# Patient Record
Sex: MALE | Race: WHITE | NOT HISPANIC OR LATINO | Employment: FULL TIME | URBAN - METROPOLITAN AREA
[De-identification: names, ages, dates, MRNs, and addresses within clinical notes are randomized per-mention and may not be internally consistent; named-entity substitution may affect disease eponyms.]

---

## 2020-06-19 ENCOUNTER — TELEPHONE (OUTPATIENT)
Dept: ORTHOPEDICS | Facility: CLINIC | Age: 55
End: 2020-06-19

## 2020-06-29 ENCOUNTER — OFFICE VISIT (OUTPATIENT)
Dept: ORTHOPEDICS | Facility: CLINIC | Age: 55
End: 2020-06-29
Payer: COMMERCIAL

## 2020-06-29 VITALS
HEART RATE: 49 BPM | SYSTOLIC BLOOD PRESSURE: 135 MMHG | HEIGHT: 70 IN | BODY MASS INDEX: 24.05 KG/M2 | DIASTOLIC BLOOD PRESSURE: 84 MMHG | WEIGHT: 168 LBS

## 2020-06-29 DIAGNOSIS — M25.551 POSTERIOR PAIN OF RIGHT HIP: Primary | ICD-10-CM

## 2020-06-29 DIAGNOSIS — R26.9 GAIT ABNORMALITY: ICD-10-CM

## 2020-06-29 PROCEDURE — 99999 PR PBB SHADOW E&M-EST. PATIENT-LVL III: CPT | Mod: PBBFAC,,, | Performed by: PHYSICAL MEDICINE & REHABILITATION

## 2020-06-29 PROCEDURE — 3008F PR BODY MASS INDEX (BMI) DOCUMENTED: ICD-10-PCS | Mod: CPTII,S$GLB,, | Performed by: PHYSICAL MEDICINE & REHABILITATION

## 2020-06-29 PROCEDURE — 99203 OFFICE O/P NEW LOW 30 MIN: CPT | Mod: S$GLB,,, | Performed by: PHYSICAL MEDICINE & REHABILITATION

## 2020-06-29 PROCEDURE — 99203 PR OFFICE/OUTPT VISIT, NEW, LEVL III, 30-44 MIN: ICD-10-PCS | Mod: S$GLB,,, | Performed by: PHYSICAL MEDICINE & REHABILITATION

## 2020-06-29 PROCEDURE — 3008F BODY MASS INDEX DOCD: CPT | Mod: CPTII,S$GLB,, | Performed by: PHYSICAL MEDICINE & REHABILITATION

## 2020-06-29 PROCEDURE — 99999 PR PBB SHADOW E&M-EST. PATIENT-LVL III: ICD-10-PCS | Mod: PBBFAC,,, | Performed by: PHYSICAL MEDICINE & REHABILITATION

## 2020-06-29 NOTE — PATIENT INSTRUCTIONS
Working through these progressions:    Unless otherwise stated, you should only be doing one exercise from each progression listed below. These are listed in order of difficulty, so start with the first one in each list. Do as many reps as you can while maintaining excellent form. Stop doing the exercise if you fatigue or can't maintain proper form. Once you can do the recommended amount of reps for that exercise, stop doing that one and move on to the next exercise in that progression list for your next workout. Try for 2 - 3 workouts per week.    Bridge Progression: The purpose is to practice pushing the leg back using the glutes while maintaining a neutral spine. Start with your foot close enough to touch the heel. Brace your core without letting your back arch or flatten, or rotate for single leg exercises. Squeeze the glutes and drive down through the heel. If you feel tightness in the low back, you need to either brace the core more, use the glutes more, or don't lift the hips quite as high. Arms down is easier, arms up is harder.     1. Double leg bridge with arms up: 3 x 10 - 20        2. Bridge march with arms up: 3 x 20 Don't allow the hips to rotate.        3. Single leg bridge arms up: 3 x 10 - 15 each leg         Hip Abduction Progression:      Lateral control - This is one of the most common problems with runners and can contribute to many different injuries and wasted energy. Improving hip strength will help significantly. As your hips get stronger, think about actively squeezing your glutes when you run - that will help get these muscles firing. Do only 1 of these exercises per workout and advance to the next exercise once you can do the recommended amount of reps.     2. Side lying leg lift (keep leg slightly extended): 3 x 15      3. Seven way hips: Start with 3 x 2 - 3 reps and progress to 3 x 8 - 10 reps. Follow this link:  https://www.[employer].com/watch?v=YdenOdoz-MI      4. Standing hip hikes: 3  x 20  (bonus points: hold core tight, good posture with weight on forefoot, and toe yoga)          5. Oscar hip exercises: 3 x 8 - 12 (Start with 8 reps. Once you build up to 12, use a tighter band.)    A. Theraband pulling straight out to side. (second exercise in this video: https://youtu.be/yW0jjElAWpH )             B. Theraband pulling 45 degrees posterior.            C. Standing hip rotations with resistance:  https://www.[employer].com/watch?v=gfHmUIuDmG0          6. Monster Walks: 3 x 30 - 60 seconds. See video: https://www.[facility] Careube.com/watch?y=yhC226VGRml  You could also do monster walks going around a small square with 5 to 10 foot sides. Go around it in each direction.    7. Standing Clamshells: 3 x 8 - 12 reps  https://www.[employer].com/watch?v=JESzBJsG7ZD      Bird-dog Progression: Excellent for working on moving limbs while maintaining spine neutral position. Don't allow the back to arch, flex, or rotate while doing these. You can place a dowel nicol or foam roller across the low back to let you know if you are rolling side to side.     1. Quadruped, Arms Only: 3 x 8 - 12        2. Quadruped, Legs Only: 3 x 8 - 12      3. Quadruped, Alternating Arm & Leg: 3 x 10 - 12 (Keep core tight, dont let back arch or roll side to side)              4. Donkey Kicks: 3 x 15 (same cues as above)      5. Fire Hydrants: 3 x 15      6. Ousmane Dogs: 3 x 12  The leg kick back is similar to the first exercise in the series, but rather than kicking straight back, youll kick about 45 degrees out and back and also externally rotate the foot out (toes point away from the body). For video: https://www.[employer].com/watch?v=5V6cF2u1VC4         Intermediate Glute Max Progression:     With these exercises:  - continue to focus on abdominal bracing  - think about using your foot muscles (toe yoga) to prevent the foot from collapsing in  - keep the middle of the kneecap lined up over the second toe  - good control is more important than  "the speed of the exercise or how many you do!  - OK to do more than one of these at a time, if your glutes can handle it.    1. Chair of Death squats: 3 x 15   Drive hips back, keeping tibia as vertical as possible. The bar Im holding is to remind me not to flex my spine on the way down, or arch my back on the way up. Think "sit back" rather than "squat down".        2. Lunges: 3 x 10 Keep torso upright and dont let knee go past toes. Do multi-directional lunges for bonus points.      3. Hip Hinges: 3 x 15 Use a braced core to keep the spine in a neutral position. A stick can help you to know if spine is staying in right position. Think hips back, hips forward. Ok to bend the knees slightly to take stretch off of the hamstrings.       4. Single leg deadlifts: 3 x 10    Remember to keep spine straight as well as the leg you are kicking back. Only once you are sure youre using good form should you drop the nicol and pick-up a dumbbell or barbell.       "

## 2020-06-29 NOTE — PROGRESS NOTES
SPORTS MEDICINE / PM&R NEW PATIENT H & P:    Referring Physician: Self, Aaareferral    Chief Complaint   Patient presents with    Right Hip - Pain       HPI: This is a 54 y.o.  male being seen in clinic today for evaluation of Pain of the Right Hip   The problem first began he states when he was 48 he did the [employer] [place]. Hip pain came on during the race and persisted after. Normal x-rays with Dr. Daniel Zhou and referred to PT. He feels aching and intermittent pain on his right upper posterior hip area. The symptoms are worsening. He has tried osteo bi-flex without improvement. He has tried physical therapy Stiven Zhou, PT. Pain has been intermittent over the years. Pain is in the posterior lateral hip. Returns any time he begins increasing running. Has not been doing strengthening as much recently.    History obtained from patient.    Past family, medical, social, surgical history, and vital signs reviewed in chart.    Review of Systems   Constitutional: Negative for chills, fever and weight loss.   HENT: Negative for hearing loss and sore throat.    Eyes: Negative for blurred vision, photophobia and pain.   Respiratory: Negative for shortness of breath.    Cardiovascular: Negative for chest pain.   Gastrointestinal: Negative for abdominal pain.   Genitourinary: Negative for dysuria.   Skin: Negative for rash.   Neurological: Negative for tingling and headaches.   Endo/Heme/Allergies: Does not bruise/bleed easily.   Psychiatric/Behavioral: Negative for depression.       General    Nursing note and vitals reviewed.  Constitutional: He is oriented to person, place, and time. He appears well-developed and well-nourished.   HENT:   Head: Normocephalic and atraumatic.   Eyes: Conjunctivae and EOM are normal. Pupils are equal, round, and reactive to light.   Neck: Neck supple.   Cardiovascular: Intact distal pulses.    Pulmonary/Chest: Effort normal. No respiratory distress.   Abdominal: He exhibits no  distension.   Neurological: He is alert and oriented to person, place, and time. He has normal reflexes.   Psychiatric: He has a normal mood and affect.     General Musculoskeletal Exam   Gait: normal   Pelvic Obliquity: none    Right Ankle/Foot Exam     Tests   Heel Walk: able to perform  Tiptoe Walk: able to perform  Double Heel Rise: unable to perform double heel rise    Left Ankle/Foot Exam     Tests   Heel Walk: able to perform  Tiptoe Walk: able to perform  Double Heel Rise: able to perform      Right Hip Exam   Right hip exam is normal.     Comments:  No pain with internal/external rotation. Mild TTP right upper glute max.  Left Hip Exam   Left hip exam is normal.      Back (L-Spine & T-Spine) / Neck (C-Spine) Exam   Back exam is normal.    Tenderness Posterior midline palpation reveals tenderness of the Lower L-Spine, Sacrum and Upper L-Spine.     Back (L-Spine & T-Spine) Range of Motion   Extension: normal   Flexion: normal   Lateral bend right: normal   Lateral bend left: normal   Rotation right: normal   Rotation left: normal     Spinal Sensation   Right Side Sensation  L-Spine Level: normal  Left Side Sensation  L-Spine Level: normal    Back (L-Spine & T-Spine) Tests   Right Side Tests  Straight leg raise:      Sitting SLR: > 70 degrees    Squat Test: able to perform  Left Side Tests  Straight leg raise:     Sitting SLR: > 70 degrees    Squat: able to perform    Other He has no scoliosis .  Spinal Kyphosis:  Absent  Spinal Lordosis:  Absent      Muscle Strength   Right Lower Extremity   Hip Abduction: 5/5   Hip Flexion: 5/5   Hip Extensors: 5/5  Quadriceps:  5/5   Hamstrin/5   Anterior tibial:  5/5/5  Gastrocsoleus:  5/5/5  EHL:  5/5  Left Lower Extremity   Hip Abduction: 5/5   Hip Flexion: 5/5   Hip Extensors: 5/5  Quadriceps:  5/5   Hamstrin/5   Anterior tibial:  5/5/5   Gastrocsoleus:  5/5/5  EHL:  5/5    Reflexes     Left Side  Quadriceps:  2+  Achilles:  2+  Ankle Clonus:  absent    Right  Side   Quadriceps:  2+  Achilles:  2+  Ankle Clonus:  absent    Vascular Exam     Right Pulses  Dorsalis Pedis:      2+          Left Pulses  Dorsalis Pedis:      2+              IMPRESSION/PLAN: Jean Claude is a 54 y.o.  male with:    1. Posterior pain of right hip    2. Gait abnormality       The findings were discussed with Jean Claude in detail. We discussed this is possibly back related, probably glute muscle related, but probably not hip joint related. He wants to work on this on his own and not go to PT at this time. He was given a long list of exercises to work on at home and is familiar with them. If not improving in a few weeks, will get some therapy visits. Further workup could include back imaging as well. All of his questions were answered. He was provided this plan in writing. He will follow-up with me PRN.     Working through these progressions:    Unless otherwise stated, you should only be doing one exercise from each progression listed below. These are listed in order of difficulty, so start with the first one in each list. Do as many reps as you can while maintaining excellent form. Stop doing the exercise if you fatigue or can't maintain proper form. Once you can do the recommended amount of reps for that exercise, stop doing that one and move on to the next exercise in that progression list for your next workout. Try for 2 - 3 workouts per week.    Bridge Progression: The purpose is to practice pushing the leg back using the glutes while maintaining a neutral spine. Start with your foot close enough to touch the heel. Brace your core without letting your back arch or flatten, or rotate for single leg exercises. Squeeze the glutes and drive down through the heel. If you feel tightness in the low back, you need to either brace the core more, use the glutes more, or don't lift the hips quite as high. Arms down is easier, arms up is harder.     1. Double leg bridge with arms up: 3 x 10 - 20        2. Bridge  march with arms up: 3 x 20 Don't allow the hips to rotate.        3. Single leg bridge arms up: 3 x 10 - 15 each leg         Hip Abduction Progression:      Lateral control - This is one of the most common problems with runners and can contribute to many different injuries and wasted energy. Improving hip strength will help significantly. As your hips get stronger, think about actively squeezing your glutes when you run - that will help get these muscles firing. Do only 1 of these exercises per workout and advance to the next exercise once you can do the recommended amount of reps.     2. Side lying leg lift (keep leg slightly extended): 3 x 15      3. Seven way hips: Start with 3 x 2 - 3 reps and progress to 3 x 8 - 10 reps. Follow this link:  https://www.[employer]ube.com/watch?v=YdenOdoz-MI      4. Standing hip hikes: 3 x 20  (bonus points: hold core tight, good posture with weight on forefoot, and toe yoga)          5. Oscar hip exercises: 3 x 8 - 12 (Start with 8 reps. Once you build up to 12, use a tighter band.)    A. Theraband pulling straight out to side. (second exercise in this video: https://youtu.be/tN8ghOmJVgL )             B. Theraband pulling 45 degrees posterior.            C. Standing hip rotations with resistance:  https://www.[employer].com/watch?v=gfHmUIuDmG0          6. Monster Walks: 3 x 30 - 60 seconds. See video: https://www.[employer]ube.com/watch?t=auI851TRLoy  You could also do monster walks going around a small square with 5 to 10 foot sides. Go around it in each direction.    7. Standing Clamshells: 3 x 8 - 12 reps  https://www.[employer].com/watch?v=EURdETxX0QF      Bird-dog Progression: Excellent for working on moving limbs while maintaining spine neutral position. Don't allow the back to arch, flex, or rotate while doing these. You can place a dowel nicol or foam roller across the low back to let you know if you are rolling side to side.     1. Quadruped, Arms Only: 3 x 8 - 12        2. Quadruped,  "Legs Only: 3 x 8 - 12      3. Quadruped, Alternating Arm & Leg: 3 x 10 - 12 (Keep core tight, dont let back arch or roll side to side)              4. Donkey Kicks: 3 x 15 (same cues as above)      5. Fire Hydrants: 3 x 15      6. Ousmane Dogs: 3 x 12  The leg kick back is similar to the first exercise in the series, but rather than kicking straight back, youll kick about 45 degrees out and back and also externally rotate the foot out (toes point away from the body). For video: https://www.[employer].com/watch?v=6I3pA3z5LW4         Intermediate Glute Max Progression:     With these exercises:  - continue to focus on abdominal bracing  - think about using your foot muscles (toe yoga) to prevent the foot from collapsing in  - keep the middle of the kneecap lined up over the second toe  - good control is more important than the speed of the exercise or how many you do!  - OK to do more than one of these at a time, if your glutes can handle it.    1. Chair of Death squats: 3 x 15   Drive hips back, keeping tibia as vertical as possible. The bar Im holding is to remind me not to flex my spine on the way down, or arch my back on the way up. Think "sit back" rather than "squat down".        2. Lunges: 3 x 10 Keep torso upright and dont let knee go past toes. Do multi-directional lunges for bonus points.      3. Hip Hinges: 3 x 15 Use a braced core to keep the spine in a neutral position. A stick can help you to know if spine is staying in right position. Think hips back, hips forward. Ok to bend the knees slightly to take stretch off of the hamstrings.       4. Single leg deadlifts: 3 x 10    Remember to keep spine straight as well as the leg you are kicking back. Only once you are sure youre using good form should you drop the nicol and pick-up a dumbbell or barbell.         Nilda Chambers M.D.  Sports Medicine    "

## 2023-06-20 DIAGNOSIS — M25.561 RIGHT KNEE PAIN, UNSPECIFIED CHRONICITY: Primary | ICD-10-CM

## 2023-06-21 ENCOUNTER — OFFICE VISIT (OUTPATIENT)
Dept: SPORTS MEDICINE | Facility: CLINIC | Age: 58
End: 2023-06-21
Payer: COMMERCIAL

## 2023-06-21 ENCOUNTER — HOSPITAL ENCOUNTER (OUTPATIENT)
Dept: RADIOLOGY | Facility: HOSPITAL | Age: 58
Discharge: HOME | End: 2023-06-21
Attending: ORTHOPAEDIC SURGERY
Payer: COMMERCIAL

## 2023-06-21 VITALS — BODY MASS INDEX: 25.05 KG/M2 | HEIGHT: 70 IN | WEIGHT: 175 LBS

## 2023-06-21 DIAGNOSIS — M25.461 EFFUSION OF RIGHT KNEE: Primary | ICD-10-CM

## 2023-06-21 DIAGNOSIS — M25.561 RIGHT KNEE PAIN, UNSPECIFIED CHRONICITY: ICD-10-CM

## 2023-06-21 DIAGNOSIS — M25.561 ACUTE PAIN OF RIGHT KNEE: ICD-10-CM

## 2023-06-21 DIAGNOSIS — M17.10 PATELLOFEMORAL ARTHRITIS: ICD-10-CM

## 2023-06-21 PROCEDURE — 73564 X-RAY EXAM KNEE 4 OR MORE: CPT | Mod: 26,RT,, | Performed by: RADIOLOGY

## 2023-06-21 PROCEDURE — 3008F PR BODY MASS INDEX (BMI) DOCUMENTED: ICD-10-PCS | Mod: CPTII,S$GLB,, | Performed by: ORTHOPAEDIC SURGERY

## 2023-06-21 PROCEDURE — 73564 X-RAY EXAM KNEE 4 OR MORE: CPT | Mod: TC,RT

## 2023-06-21 PROCEDURE — 73562 X-RAY EXAM OF KNEE 3: CPT | Mod: 26,LT,, | Performed by: RADIOLOGY

## 2023-06-21 PROCEDURE — 3008F BODY MASS INDEX DOCD: CPT | Mod: CPTII,S$GLB,, | Performed by: ORTHOPAEDIC SURGERY

## 2023-06-21 PROCEDURE — 99999 PR PBB SHADOW E&M-EST. PATIENT-LVL III: CPT | Mod: PBBFAC,,, | Performed by: ORTHOPAEDIC SURGERY

## 2023-06-21 PROCEDURE — 73564 XR KNEE ORTHO RIGHT WITH FLEXION: ICD-10-PCS | Mod: 26,RT,, | Performed by: RADIOLOGY

## 2023-06-21 PROCEDURE — 99213 PR OFFICE/OUTPT VISIT, EST, LEVL III, 20-29 MIN: ICD-10-PCS | Mod: S$GLB,,, | Performed by: ORTHOPAEDIC SURGERY

## 2023-06-21 PROCEDURE — 99213 OFFICE O/P EST LOW 20 MIN: CPT | Mod: S$GLB,,, | Performed by: ORTHOPAEDIC SURGERY

## 2023-06-21 PROCEDURE — 99999 PR PBB SHADOW E&M-EST. PATIENT-LVL III: ICD-10-PCS | Mod: PBBFAC,,, | Performed by: ORTHOPAEDIC SURGERY

## 2023-06-21 PROCEDURE — 73562 XR KNEE ORTHO RIGHT WITH FLEXION: ICD-10-PCS | Mod: 26,LT,, | Performed by: RADIOLOGY

## 2023-06-21 PROCEDURE — 1159F MED LIST DOCD IN RCRD: CPT | Mod: CPTII,S$GLB,, | Performed by: ORTHOPAEDIC SURGERY

## 2023-06-21 PROCEDURE — 1159F PR MEDICATION LIST DOCUMENTED IN MEDICAL RECORD: ICD-10-PCS | Mod: CPTII,S$GLB,, | Performed by: ORTHOPAEDIC SURGERY

## 2023-06-21 NOTE — PROGRESS NOTES
Patient ID: Jean Claude Olivo  YOB: 1965  MRN: [id number]    Chief Complaint: Pain and Swelling of the Right Knee      Referred By: Colleen Chambers through [facility] online    History of Present Illness: Jean Claude Olivo is a  57 y.o. male   [place] with a chief complaint of Pain and Swelling of the Right Knee    The patient presents today with right knee pain. He reports onset of pain a few weeks ago with no injury. He reports he is active in running and training for a marathon in [place]. He reports anterior knee popping he reports seeing Rodney Rosenda recently who tapped his knee which provided minimal relief while running. He is unable to run at this time due to pain. He reports weakness and his knee will give out. He reports mostly anterior knee pain and some joint line tenderness. He does reports swelling. His weekly volume prior to pain was 40-50 miles a week. He finished a marathon in April in [place].     HPI    Past Medical History:   History reviewed. No pertinent past medical history.  History reviewed. No pertinent surgical history.  History reviewed. No pertinent family history.  Social History     Socioeconomic History    Marital status: Single   Tobacco Use    Smoking status: Never    Smokeless tobacco: Never   Substance and Sexual Activity    Alcohol use: Never    Drug use: Never       Review of patient's allergies indicates:  No Known Allergies  ROS    Physical Exam:   Body mass index is 25.11 kg/m².  There were no vitals filed for this visit.   GENERAL: Well appearing, appropriate for stated age, no acute distress.  CARDIOVASCULAR: Pulses regular by peripheral palpation.  PULMONARY: Respirations are even and non-labored.  NEURO: Awake, alert, and oriented x 3.  PSYCH: Mood & affect are appropriate.  HEENT: Head is normocephalic and atraumatic.            Right Knee Exam     Inspection   Effusion: present    Tenderness   The patient is tender to palpation of the  medial joint line.    Crepitus   The patient has crepitus of the patella.    Range of Motion   Extension:  0   Flexion:  130     Tests   Meniscus   Salvatore:  Medial - positive   Ligament Examination   Lachman: normal (-1 to 2mm)   PCL-Posterior Drawer: normal (0 to 2mm)     MCL - Valgus: normal (0 to 2mm)  LCL - Varus: normal    Other   Sensation: normal    Comments:  Neutral to slight varus  Mechanical symptoms with [facility]   Mild to moderate effusion  Hip range of motion symmetric     Muscle Strength   Right Lower Extremity   Hip Abduction: 5/5   Quadriceps:  5/5   Hamstrin/5     Vascular Exam     Right Pulses  Dorsalis Pedis:      2+  Posterior Tibial:      2+        Imaging:    X-ray Knee Ortho Right with Flexion  Narrative: EXAM: XR KNEE ORTHO RIGHT WITH FLEXION    CLINICAL INDICATION:   Pain in right knee    FINDINGS:  No comparison studies are available.  AP standing views, AP standing flexion views and sunrise views of both knees, as well as a lateral view of the right knee were submitted for interpretation.  Joint spaces are well-maintained.  Negative for knee joint effusion.    Alignment is satisfactory. No     fractures, dislocations, or erosive arthritic change.  Negative for radiopaque foreign bodies or air in the soft tissues.  Superior patellar spurring on the right.  Impression: 1.  Negative for acute process involving the right or left knee.  2.  Superior patellar spurring on the right.    Finalized on: 2023 7:57 AM By:  Emir Garsia MD  [facility]# [id number]      2023 07:59:[id number]    [facility]      Relevant imaging results reviewed and interpreted by me, discussed with the patient and / or family today.     Other Tests:         Patient Instructions   Assessment:  Jean Claude Olivo is a  57 y.o. male   [place] with a chief complaint of Pain and Swelling of the Right Knee    Right knee pain and swelling  Mechanical Symptoms , possible cartilage vs meniscus  PF early arthritis    Encounter  Diagnoses   Name Primary?    Effusion of right knee Yes    Acute pain of right knee     Patellofemoral arthritis       Plan:  Discussed possible involvement of back/hip, but will focus on knee at this time. Discussed possible rehab with [facility]    Knee sleeve   Concern for meniscus/cartilage; pain affecting weight bearing - MRI of right knee    Follow-up: AFTER MRI or sooner if there are any problems between now and then.    Leave Review:   Google: Leave Google Review  Healthgrades: Leave Healthgrades Review    After Hours Number: [phone]       Provider Note/Medical Decision Making:       I discussed worrisome and red flag signs and symptoms with the patient. The patient expressed understanding and agreed to alert me immediately or to go to the emergency room if they experience any of these.   Treatment plan was developed with input from the patient/family, and they expressed understanding and agreement with the plan. All questions were answered today.          Jerald Wiley MD  Orthopaedic Surgery & Sports Medicine       Disclaimer: This note was prepared using a voice recognition system and is likely to have sound alike errors within the text.     I, Sarai Fairbanks, acted as a scribe for Jerald Wiley MD for the duration of this office visit.

## 2023-06-21 NOTE — PATIENT INSTRUCTIONS
Assessment:  Jean Claude Olivo is a  57 y.o. male   [place] with a chief complaint of Pain and Swelling of the Right Knee    Right knee pain and swelling  Mechanical Symptoms , possible cartilage vs meniscus  PF early arthritis    Encounter Diagnoses   Name Primary?    Effusion of right knee Yes    Acute pain of right knee     Patellofemoral arthritis       Plan:  Discussed possible involvement of back/hip, but will focus on knee at this time. [facility]    Knee sleeve   Concern for meniscus/cartilage; pain affecting weight bearing - MRI of right knee    Follow-up: AFTER MRI or sooner if there are any problems between now and then.    Leave Review:   Google: Leave Google Review  Healthgrades: Leave Healthgrades Review    After Hours Number: [phone]

## 2023-06-23 ENCOUNTER — HOSPITAL ENCOUNTER (OUTPATIENT)
Dept: RADIOLOGY | Facility: HOSPITAL | Age: 58
Discharge: HOME | End: 2023-06-23
Attending: ORTHOPAEDIC SURGERY
Payer: COMMERCIAL

## 2023-06-23 DIAGNOSIS — M25.461 EFFUSION OF RIGHT KNEE: ICD-10-CM

## 2023-06-23 DIAGNOSIS — M17.10 PATELLOFEMORAL ARTHRITIS: ICD-10-CM

## 2023-06-23 DIAGNOSIS — M25.561 ACUTE PAIN OF RIGHT KNEE: ICD-10-CM

## 2023-06-23 PROCEDURE — 73721 MRI JNT OF LWR EXTRE W/O DYE: CPT | Mod: TC,PN,RT

## 2023-06-23 PROCEDURE — 73721 MRI JNT OF LWR EXTRE W/O DYE: CPT | Mod: 26,RT,, | Performed by: RADIOLOGY

## 2023-06-23 PROCEDURE — 73721 MRI KNEE WITHOUT CONTRAST RIGHT: ICD-10-PCS | Mod: 26,RT,, | Performed by: RADIOLOGY

## 2023-06-29 ENCOUNTER — OFFICE VISIT (OUTPATIENT)
Dept: SPORTS MEDICINE | Facility: CLINIC | Age: 58
End: 2023-06-29
Payer: COMMERCIAL

## 2023-06-29 VITALS — WEIGHT: 175 LBS | BODY MASS INDEX: 25.05 KG/M2 | HEIGHT: 70 IN

## 2023-06-29 DIAGNOSIS — M17.10 PATELLOFEMORAL ARTHRITIS: Primary | ICD-10-CM

## 2023-06-29 DIAGNOSIS — M24.10 ACQUIRED DEFECT OF ARTICULAR CARTILAGE: ICD-10-CM

## 2023-06-29 PROCEDURE — 3008F BODY MASS INDEX DOCD: CPT | Mod: CPTII,S$GLB,, | Performed by: ORTHOPAEDIC SURGERY

## 2023-06-29 PROCEDURE — 99999 PR PBB SHADOW E&M-EST. PATIENT-LVL III: CPT | Mod: PBBFAC,,, | Performed by: ORTHOPAEDIC SURGERY

## 2023-06-29 PROCEDURE — 99999 PR PBB SHADOW E&M-EST. PATIENT-LVL III: ICD-10-PCS | Mod: PBBFAC,,, | Performed by: ORTHOPAEDIC SURGERY

## 2023-06-29 PROCEDURE — 99214 PR OFFICE/OUTPT VISIT, EST, LEVL IV, 30-39 MIN: ICD-10-PCS | Mod: S$GLB,,, | Performed by: ORTHOPAEDIC SURGERY

## 2023-06-29 PROCEDURE — 99214 OFFICE O/P EST MOD 30 MIN: CPT | Mod: S$GLB,,, | Performed by: ORTHOPAEDIC SURGERY

## 2023-06-29 PROCEDURE — 3008F PR BODY MASS INDEX (BMI) DOCUMENTED: ICD-10-PCS | Mod: CPTII,S$GLB,, | Performed by: ORTHOPAEDIC SURGERY

## 2023-06-29 NOTE — PROGRESS NOTES
Patient ID: Jean Claude Olivo  YOB: 1965  MRN: [id number]    Chief Complaint: Follow-up of the Right Knee    Referred By: Colleen Chambers through [facility] online    History of Present Illness: Jean Claude Olivo is a  57 y.o. male   Jewler with a chief complaint of Follow-up of the Right Knee      The patient presents  today for follow up and MRI review of his right knee. He has not been able to run at this time due to the pain.     Recall from last visit: The patient presents today with right knee pain. He reports onset of pain a few weeks ago with no injury. He reports he is active in running and training for a marathon in [place]. He reports anterior knee popping he reports seeing Rodney Herzog recently who tapped his knee which provided minimal relief while running. He is unable to run at this time due to pain. He reports weakness and his knee will give out. He reports mostly anterior knee pain and some joint line tenderness. He does reports swelling. His weekly volume prior to pain was 40-50 miles a week. He finished a marathon in April in [place].     HPI    Past Medical History:   No past medical history on file.  No past surgical history on file.  No family history on file.  Social History     Socioeconomic History    Marital status: Single   Tobacco Use    Smoking status: Never    Smokeless tobacco: Never   Substance and Sexual Activity    Alcohol use: Never    Drug use: Never       Review of patient's allergies indicates:  No Known Allergies  ROS    Physical Exam:   Body mass index is 25.11 kg/m².  There were no vitals filed for this visit.   GENERAL: Well appearing, appropriate for stated age, no acute distress.  CARDIOVASCULAR: Pulses regular by peripheral palpation.  PULMONARY: Respirations are even and non-labored.  NEURO: Awake, alert, and oriented x 3.  PSYCH: Mood & affect are appropriate.  HEENT: Head is normocephalic and atraumatic.            Right Knee Exam      Inspection   Effusion: present    Tenderness   The patient is tender to palpation of the medial joint line.    Crepitus   The patient has crepitus of the patella.    Range of Motion   Extension:  0   Flexion:  130     Tests   Meniscus   Juan:  Medial - positive   Ligament Examination   Lachman: normal (-1 to 2mm)   PCL-Posterior Drawer: normal (0 to 2mm)     MCL - Valgus: normal (0 to 2mm)  LCL - Varus: normal    Other   Sensation: normal    Comments:  Neutral to slight varus  Mechanical symptoms with Juan   Mild to moderate effusion  Hip range of motion symmetric     Muscle Strength   Right Lower Extremity   Hip Abduction: 5/5   Quadriceps:  5/5   Hamstrin/5     Vascular Exam     Right Pulses  Dorsalis Pedis:      2+  Posterior Tibial:      2+        Imaging:    MRI Knee Without Contrast Right  Narrative: EXAM: MRI KNEE WITHOUT CONTRAST RIGHT    CLINICAL HISTORY: Right knee pain.    TECHNIQUE: Standard multiplanar pulse sequences knee obtained without IV or intra-articular contrast.    COMPARISON: None.    FINDINGS:    Normal cruciate ligaments.  Normal collateral ligaments.  Low to moderate grade tendinosis distal quadriceps tendon with low-grade intrasubstance and insertional partial tearing.  Mild adjacent soft tissue edema.  Minimal tendinosis distal quadriceps tendon.    Intact medial meniscus.    Normal lateral meniscus.    Minimal focal chondral irregularity mid weightbearing surface medial femoral condyle.    Lateral compartment cartilage well-preserved.    Patella cartilage well-preserved.  Large full-thickness chondral defect superior aspect of the central and lateral trochlea measures 12 x 14 mm.  Moderate underlying subcutaneous chondral marrow edema.    Minimal effusion.  Suprapatella plica identified.  Trace fluid semimembranosus/medial gastrocnemius bursa.  No intra-articular loose body.  Remaining bony architecture marrow signal normal.             Mild soft tissue edema anterior  subcutaneous cutaneous fat.  Remaining spring soft tissues and musculature are normal.  Impression: 1.    Low to moderate grade tendinosis distal quadriceps tendon with low-grade intrasubstance and insertional partial tearing.    2.    Minimal focal chondral degeneration mid weightbearing surface medial femoral condyle.    3.    12 x 14 mm full-thickness chondral defect or possible chondral fracture of the trochlea with underlying marrow edema.  No associated chondral loose body can be identified.    Finalized on: 6/23/2023 11:58 AM By:  Javid Shannon MD  [facility]# [id number]      2023-06-23 12:00:17.035    [facility]      Relevant imaging results reviewed and interpreted by me, discussed with the patient and / or family today.     Other Tests:         Patient Instructions   Assessment:  Jean Claude Olivo is a  57 y.o. male   Jewler with a chief complaint of Follow-up of the Right Knee    Minimal focal chondral degeneration mid weightbearing surface medial femoral condyle.  PF early arthritis  2 x 14 mm full-thickness chondral defect of the trochlea with underlying marrow edema    Encounter Diagnoses   Name Primary?    Patellofemoral arthritis Yes    Acquired defect of articular cartilage         Plan:  Rehab with Kecia at [facility]-patient preferred location   Recommend low impact at this time, goal would be to get patient back to running at this time  Continue knee sleeve  Discussed possible role of arthroscopy in the future   Discussed possible CSI-patient deferred at this time   Discussed possible visco-supplementation in the future and/or PRP ACP injection    Peer-reviewed literature has demonstrated the efficacy of leukocyte-poor platelet-rich plasma (PRP) for knee arthritis. PRP is known to decrease inflammatory enzymes in the knee (GEN Llamas, 2016). It also provides grow factors that stimulate stem cells and cartilage cells (Makeda ochoa al, J Knee Surg, 2018). Research suggests that in some cases it is as  effective as stem cell injections. PRP has been shown to be more effective for knee arthritis than corticosteroid injections or viscosupplementation in terms of knee pain and function.  PRP has been shown to be more cost-effective than other forms of treatment for knee arthritis. (Juan R et al, IJCR 2018)  Research has also shown that PRP + viscosupplementation may be synergistic. In fact, chondrocytes cultured with both PRP and HA showed increased proliferation rates and glycosaminoglycan contents compared with those cultured with HA alone, suggesting that the combined injection may provide a more therapeutic environment (Korey et al., Arthroscopy, 2018).       Follow-up: 2 months or sooner if there are any problems between now and then.    Leave Review:   Google: Leave Google Review  Healthgrades: Leave Healthgrades Review    After Hours Number: [phone]     k  Provider Note/Medical Decision Making:       I had a long discussion with the patient about the causes, treatments, and prognosis for knee arthritis. We discussed that although there is not a cure for arthritis, there are effective ways to improve symptoms. I recommended low impact activities such as elliptical and bicycle. I talked about the fact that aquatic and pool therapy is often helpful. I advised the patient to consider good quality stability and cushioned shoes. We talked about the importance of knee motion in the health of the knee. The patient can also use a compression knee sleeve to help limit swelling and provide proprioceptive feedback. We recommend formal physical therapy or at minimum a home exercise program, which we discussed with the patient. I advised that over the counter solutions such as glucosamine and chondroitin may help with symptoms.  I discussed worrisome and red flag signs and symptoms with the patient. The patient expressed understanding and agreed to alert me immediately or to go to the emergency room if they  experience any of these.   Treatment plan was developed with input from the patient/family, and they expressed understanding and agreement with the plan. All questions were answered today.          Jerald Wiley MD  Orthopaedic Surgery & Sports Medicine       Disclaimer: This note was prepared using a voice recognition system and is likely to have sound alike errors within the text.     I, Sarai Fairbanks, acted as a scribe for Jerald Wiley MD for the duration of this office visit.

## 2023-06-29 NOTE — PATIENT INSTRUCTIONS
Assessment:  Jean Claude Olivo is a  57 y.o. male   Jewler with a chief complaint of Follow-up of the Right Knee    Minimal focal chondral degeneration mid weightbearing surface medial femoral condyle.  PF early arthritis  2 x 14 mm full-thickness chondral defect of the trochlea with underlying marrow edema    Encounter Diagnoses   Name Primary?    Patellofemoral arthritis Yes    Acquired defect of articular cartilage         Plan:  Rehab with Kecia at [facility]-patient preferred location   Recommend low impact at this time, goal would be to get patient back to running at this time  Continue knee sleeve  Discussed possible role of arthroscopy in the future   Discussed possible CSI-patient deferred at this time   Discussed possible visco-supplementation in the future and/or PRP ACP injection    Peer-reviewed literature has demonstrated the efficacy of leukocyte-poor platelet-rich plasma (PRP) for knee arthritis. PRP is known to decrease inflammatory enzymes in the knee (GEN Llamas, 2016). It also provides grow factors that stimulate stem cells and cartilage cells (Makeda et al, J Knee Surg, 2018). Research suggests that in some cases it is as effective as stem cell injections. PRP has been shown to be more effective for knee arthritis than corticosteroid injections or viscosupplementation in terms of knee pain and function.  PRP has been shown to be more cost-effective than other forms of treatment for knee arthritis. (Juan R et al, IJCR 2018)  Research has also shown that PRP + viscosupplementation may be synergistic. In fact, chondrocytes cultured with both PRP and HA showed increased proliferation rates and glycosaminoglycan contents compared with those cultured with HA alone, suggesting that the combined injection may provide a more therapeutic environment (Korey et al., Arthroscopy, 2018).       Follow-up: 2 months or sooner if there are any problems between now and then.    Leave Review:   Google: Leave  Google Review  Healthgrades: Leave Healthgrades Review    After Hours Number: [phone] k

## 2023-08-14 ENCOUNTER — OFFICE VISIT (OUTPATIENT)
Dept: SPORTS MEDICINE | Facility: CLINIC | Age: 58
End: 2023-08-14
Payer: COMMERCIAL

## 2023-08-14 VITALS — BODY MASS INDEX: 25.05 KG/M2 | HEIGHT: 70 IN | WEIGHT: 175 LBS

## 2023-08-14 DIAGNOSIS — M24.10 ARTICULAR CARTILAGE DISORDER: Primary | ICD-10-CM

## 2023-08-14 DIAGNOSIS — M17.11 OSTEOARTHRITIS OF RIGHT PATELLOFEMORAL JOINT: ICD-10-CM

## 2023-08-14 PROCEDURE — 99999 PR PBB SHADOW E&M-EST. PATIENT-LVL III: ICD-10-PCS | Mod: PBBFAC,,, | Performed by: ORTHOPAEDIC SURGERY

## 2023-08-14 PROCEDURE — 3008F PR BODY MASS INDEX (BMI) DOCUMENTED: ICD-10-PCS | Mod: CPTII,S$GLB,, | Performed by: ORTHOPAEDIC SURGERY

## 2023-08-14 PROCEDURE — 99999 PR PBB SHADOW E&M-EST. PATIENT-LVL III: CPT | Mod: PBBFAC,,, | Performed by: ORTHOPAEDIC SURGERY

## 2023-08-14 PROCEDURE — 99214 OFFICE O/P EST MOD 30 MIN: CPT | Mod: S$GLB,,, | Performed by: ORTHOPAEDIC SURGERY

## 2023-08-14 PROCEDURE — 1159F PR MEDICATION LIST DOCUMENTED IN MEDICAL RECORD: ICD-10-PCS | Mod: CPTII,S$GLB,, | Performed by: ORTHOPAEDIC SURGERY

## 2023-08-14 PROCEDURE — 3008F BODY MASS INDEX DOCD: CPT | Mod: CPTII,S$GLB,, | Performed by: ORTHOPAEDIC SURGERY

## 2023-08-14 PROCEDURE — 1159F MED LIST DOCD IN RCRD: CPT | Mod: CPTII,S$GLB,, | Performed by: ORTHOPAEDIC SURGERY

## 2023-08-14 PROCEDURE — 99214 PR OFFICE/OUTPT VISIT, EST, LEVL IV, 30-39 MIN: ICD-10-PCS | Mod: S$GLB,,, | Performed by: ORTHOPAEDIC SURGERY

## 2023-08-14 NOTE — PATIENT INSTRUCTIONS
Assessment:  Jean Claude Olivo is a  57 y.o. male   Jewler with a chief complaint of Pain and Follow-up of the Right Knee    Minimal focal chondral degeneration mid weightbearing surface medial femoral condyle.  PF early arthritis  2 x 14 mm full-thickness chondral defect of the trochlea with underlying marrow edema    Encounter Diagnoses   Name Primary?    Articular cartilage disorder Yes    Osteoarthritis of right patellofemoral joint         Plan:  Discussed treatment options and diagnosis and goals of patients needs   Recommend low impact at this time, goal would be to get patient back to running at this time at the mileage at this point   Continue knee sleeve  Discussed possible use of medial    Recommend exercises to focus on the glute, can either do Colleen Chambers's class   Discussed possible visco-supplementation in the future and/or PRP ACP injection    Peer-reviewed literature has demonstrated the efficacy of leukocyte-poor platelet-rich plasma (PRP) for knee arthritis. PRP is known to decrease inflammatory enzymes in the knee (GEN Llamas, 2016). It also provides grow factors that stimulate stem cells and cartilage cells (Makeda et al, J Knee Surg, 2018). Research suggests that in some cases it is as effective as stem cell injections. PRP has been shown to be more effective for knee arthritis than corticosteroid injections or viscosupplementation in terms of knee pain and function.  PRP has been shown to be more cost-effective than other forms of treatment for knee arthritis. (Juan R et al, IJCR 2018)  Research has also shown that PRP + viscosupplementation may be synergistic. In fact, chondrocytes cultured with both PRP and HA showed increased proliferation rates and glycosaminoglycan contents compared with those cultured with HA alone, suggesting that the combined injection may provide a more therapeutic environment (Korey et al., Arthroscopy, 2018).       Follow-up: As needed or sooner if  there are any problems between now and then.    Leave Review:   Google: Leave Google Review  Healthgrades: Leave Healthgrades Review    After Hours Number: [phone] k

## 2023-08-14 NOTE — PROGRESS NOTES
Patient ID: Jean Claude Olivo  YOB: 1965  MRN: [id number]    Chief Complaint: Pain and Follow-up of the Right Knee    Referred By: Colleen Chambers through [facility] online    History of Present Illness: Jean Claude Olivo is a 57 y.o. male   Jewler with a chief complaint of Pain and Follow-up of the Right Knee    The patient presents today for follow up of his right knee. He has not been able to run at this time due to the pain. He did try to walk about 5 miles this past weekend with no issues or pain. He overall doing better, but he is also resting at this time. He is still hoping to be able to run the [place] Marathon. He has not done physical therapy. He does have some pain with stepping up a curb.      Previous HPI 6/29/2023: The patient presents  today for follow up and MRI review of his right knee. He has not been able to run at this time due to the pain.      Recall from last visit 6/21/2023: The patient presents today with right knee pain. He reports onset of pain a few weeks ago with no injury. He reports he is active in running and training for a marathon in [place]. He reports anterior knee popping he reports seeing Rodney Herzog recently who tapped his knee which provided minimal relief while running. He is unable to run at this time due to pain. He reports weakness and his knee will give out. He reports mostly anterior knee pain and some joint line tenderness. He does reports swelling. His weekly volume prior to pain was 40-50 miles a week. He finished a marathon in April in [place].     HPI    Past Medical History:   History reviewed. No pertinent past medical history.  History reviewed. No pertinent surgical history.  History reviewed. No pertinent family history.  Social History     Socioeconomic History    Marital status: Single   Tobacco Use    Smoking status: Never    Smokeless tobacco: Never   Substance and Sexual Activity    Alcohol use: Never    Drug use: Never        Review of patient's allergies indicates:  No Known Allergies  ROS    Physical Exam:   Body mass index is 25.11 kg/m².  There were no vitals filed for this visit.   GENERAL: Well appearing, appropriate for stated age, no acute distress.  CARDIOVASCULAR: Pulses regular by peripheral palpation.  PULMONARY: Respirations are even and non-labored.  NEURO: Awake, alert, and oriented x 3.  PSYCH: Mood & affect are appropriate.  HEENT: Head is normocephalic and atraumatic.            Right Knee Exam     Inspection   Effusion: absent    Tenderness   The patient is tender to palpation of the medial joint line (Trochlea).    Crepitus   The patient has crepitus of the patella.    Range of Motion   Extension:  0   Flexion:  120     Tests   Meniscus   Salvatore:  Medial - negative   Patella   Patellar Grind: positive    Other   Sensation: normal    Comments:  Intact EHL, FHL, gastrocsoleus, and tibialis anterior. Sensation intact to light touch in superficial peroneal, deep peroneal, tibial, sural, and saphenous nerve distributions. Foot warm and well perfused with capillary refill of less than 2 seconds and palpable pedal pulses.      Muscle Strength   Right Lower Extremity   Hip Abduction: 5/5   Quadriceps:  5/5   Hamstrin/5     Vascular Exam     Right Pulses  Dorsalis Pedis:      2+  Posterior Tibial:      2+          Imaging:    MRI Knee Without Contrast Right  Narrative: EXAM: MRI KNEE WITHOUT CONTRAST RIGHT    CLINICAL HISTORY: Right knee pain.    TECHNIQUE: Standard multiplanar pulse sequences knee obtained without IV or intra-articular contrast.    COMPARISON: None.    FINDINGS:    Normal cruciate ligaments.  Normal collateral ligaments.  Low to moderate grade tendinosis distal quadriceps tendon with low-grade intrasubstance and insertional partial tearing.  Mild adjacent soft tissue edema.  Minimal tendinosis distal quadriceps tendon.    Intact medial meniscus.    Normal lateral meniscus.    Minimal focal  chondral irregularity mid weightbearing surface medial femoral condyle.    Lateral compartment cartilage well-preserved.    Patella cartilage well-preserved.  Large full-thickness chondral defect superior aspect of the central and lateral trochlea measures 12 x 14 mm.  Moderate underlying subcutaneous chondral marrow edema.    Minimal effusion.  Suprapatella plica identified.  Trace fluid semimembranosus/medial gastrocnemius bursa.  No intra-articular loose body.  Remaining bony architecture marrow signal normal.             Mild soft tissue edema anterior subcutaneous cutaneous fat.  Remaining spring soft tissues and musculature are normal.  Impression: 1.    Low to moderate grade tendinosis distal quadriceps tendon with low-grade intrasubstance and insertional partial tearing.    2.    Minimal focal chondral degeneration mid weightbearing surface medial femoral condyle.    3.    12 x 14 mm full-thickness chondral defect or possible chondral fracture of the trochlea with underlying marrow edema.  No associated chondral loose body can be identified.    Finalized on: 6/23/2023 11:58 AM By:  Javid Shannon MD  [facility]# [id number]      2023-06-23 12:00:17.035    [facility]      Relevant imaging results reviewed and interpreted by me, discussed with the patient and / or family today.     Other Tests:         Patient Instructions   Assessment:  Jean Claude Olivo is a  57 y.o. male   Jewler with a chief complaint of Pain and Follow-up of the Right Knee    Minimal focal chondral degeneration mid weightbearing surface medial femoral condyle.  PF early arthritis  2 x 14 mm full-thickness chondral defect of the trochlea with underlying marrow edema    Encounter Diagnoses   Name Primary?    Articular cartilage disorder Yes    Osteoarthritis of right patellofemoral joint         Plan:  Discussed treatment options and diagnosis and goals of patients needs   Recommend low impact at this time, goal would be to get patient back to  running at this time at the mileage at this point   Continue knee sleeve  Discussed possible use of medial    Recommend exercises to focus on the glute, can either do Colleen Chambers's class   Discussed possible visco-supplementation in the future and/or PRP ACP injection    Peer-reviewed literature has demonstrated the efficacy of leukocyte-poor platelet-rich plasma (PRP) for knee arthritis. PRP is known to decrease inflammatory enzymes in the knee (GEN Llamas, 2016). It also provides grow factors that stimulate stem cells and cartilage cells (Makeda et al, J Knee Surg, 2018). Research suggests that in some cases it is as effective as stem cell injections. PRP has been shown to be more effective for knee arthritis than corticosteroid injections or viscosupplementation in terms of knee pain and function.  PRP has been shown to be more cost-effective than other forms of treatment for knee arthritis. (Juan R et al, IJCR 2018)  Research has also shown that PRP + viscosupplementation may be synergistic. In fact, chondrocytes cultured with both PRP and HA showed increased proliferation rates and glycosaminoglycan contents compared with those cultured with HA alone, suggesting that the combined injection may provide a more therapeutic environment (Korey et al., Arthroscopy, 2018).       Follow-up: As needed or sooner if there are any problems between now and then.    Leave Review:   Google: Leave Google Review  Healthgrades: Leave Healthgrades Review    After Hours Number: [phone]     k  Provider Note/Medical Decision Making:       I discussed worrisome and red flag signs and symptoms with the patient. The patient expressed understanding and agreed to alert me immediately or to go to the emergency room if they experience any of these.   Treatment plan was developed with input from the patient/family, and they expressed understanding and agreement with the plan. All questions were answered  today.          Jerald Wiley MD  Orthopaedic Surgery & Sports Medicine       Disclaimer: This note was prepared using a voice recognition system and is likely to have sound alike errors within the text.     I, Sarai Fairbanks, acted as a scribe for Jerald Wiley MD for the duration of this office visit.

## 2023-08-14 NOTE — PROGRESS NOTES
Patient ID: Jean Claude Olivo  YOB: 1965  MRN: [id number]    Chief Complaint: Pain and Follow-up of the Right Knee      Referred By: Colleen Chambers through [facility] online    History of Present Illness: Jean Claude Olivo is a 57 y.o. male   [place] with a chief complaint of Pain and Follow-up of the Right Knee    The patient presents today for follow up of his right knee. He has not been able to run at this time due to the pain. He did try to walk about 5 miles this past weekend with no issues or pain.         Previous HPI 6/29/2023: The patient presents  today for follow up and MRI review of his right knee. He has not been able to run at this time due to the pain.      Recall from last visit 6/21/2023: The patient presents today with right knee pain. He reports onset of pain a few weeks ago with no injury. He reports he is active in running and training for a marathon in [place]. He reports anterior knee popping he reports seeing Rodney Herzog recently who tapped his knee which provided minimal relief while running. He is unable to run at this time due to pain. He reports weakness and his knee will give out. He reports mostly anterior knee pain and some joint line tenderness. He does reports swelling. His weekly volume prior to pain was 40-50 miles a week. He finished a marathon in April in [place].     HPI    Past Medical History:   History reviewed. No pertinent past medical history.  History reviewed. No pertinent surgical history.  History reviewed. No pertinent family history.  Social History     Socioeconomic History    Marital status: Single   Tobacco Use    Smoking status: Never    Smokeless tobacco: Never   Substance and Sexual Activity    Alcohol use: Never    Drug use: Never       Review of patient's allergies indicates:  No Known Allergies  ROS    Physical Exam:   Body mass index is 25.11 kg/m².  There were no vitals filed for this visit.   GENERAL: Well appearing,  appropriate for stated age, no acute distress.  CARDIOVASCULAR: Pulses regular by peripheral palpation.  PULMONARY: Respirations are even and non-labored.  NEURO: Awake, alert, and oriented x 3.  PSYCH: Mood & affect are appropriate.  HEENT: Head is normocephalic and atraumatic.  Ortho/SPM Exam  ***    Imaging:    MRI Knee Without Contrast Right  Narrative: EXAM: MRI KNEE WITHOUT CONTRAST RIGHT    CLINICAL HISTORY: Right knee pain.    TECHNIQUE: Standard multiplanar pulse sequences knee obtained without IV or intra-articular contrast.    COMPARISON: None.    FINDINGS:    Normal cruciate ligaments.  Normal collateral ligaments.  Low to moderate grade tendinosis distal quadriceps tendon with low-grade intrasubstance and insertional partial tearing.  Mild adjacent soft tissue edema.  Minimal tendinosis distal quadriceps tendon.    Intact medial meniscus.    Normal lateral meniscus.    Minimal focal chondral irregularity mid weightbearing surface medial femoral condyle.    Lateral compartment cartilage well-preserved.    Patella cartilage well-preserved.  Large full-thickness chondral defect superior aspect of the central and lateral trochlea measures 12 x 14 mm.  Moderate underlying subcutaneous chondral marrow edema.    Minimal effusion.  Suprapatella plica identified.  Trace fluid semimembranosus/medial gastrocnemius bursa.  No intra-articular loose body.  Remaining bony architecture marrow signal normal.             Mild soft tissue edema anterior subcutaneous cutaneous fat.  Remaining spring soft tissues and musculature are normal.  Impression: 1.    Low to moderate grade tendinosis distal quadriceps tendon with low-grade intrasubstance and insertional partial tearing.    2.    Minimal focal chondral degeneration mid weightbearing surface medial femoral condyle.    3.    12 x 14 mm full-thickness chondral defect or possible chondral fracture of the trochlea with underlying marrow edema.  No associated chondral  loose body can be identified.    Finalized on: 6/23/2023 11:58 AM By:  Javid Shannon MD  [facility]# [id number]      2023-06-23 12:00:17.035    JEFF    ***  Relevant imaging results reviewed and interpreted by me, discussed with the patient and / or family today. ***    Other Tests:     ***    There are no Patient Instructions on file for this visit.  Provider Note/Medical Decision Making: ***      I discussed worrisome and red flag signs and symptoms with the patient. The patient expressed understanding and agreed to alert me immediately or to go to the emergency room if they experience any of these.   Treatment plan was developed with input from the patient/family, and they expressed understanding and agreement with the plan. All questions were answered today.          Jerald Wiley MD  Orthopaedic Surgery & Sports Medicine       Disclaimer: This note was prepared using a voice recognition system and is likely to have sound alike errors within the text.